# Patient Record
Sex: FEMALE | URBAN - METROPOLITAN AREA
[De-identification: names, ages, dates, MRNs, and addresses within clinical notes are randomized per-mention and may not be internally consistent; named-entity substitution may affect disease eponyms.]

---

## 2022-04-18 ENCOUNTER — TELEPHONE ENCOUNTER (OUTPATIENT)
Dept: URBAN - METROPOLITAN AREA CLINIC 33 | Facility: CLINIC | Age: 47
End: 2022-04-18

## 2022-04-18 ENCOUNTER — OFFICE VISIT (OUTPATIENT)
Dept: URBAN - METROPOLITAN AREA CLINIC 33 | Facility: CLINIC | Age: 47
End: 2022-04-18

## 2022-04-18 NOTE — HPI-DYSPHAGIA
Patient presents today for consultation about dysphagia.  Onset was __ and episodes occur ___ times per __.  Dysphagia occurs with __ solids _ liquids. Patient admits /denies having previous EGD or Barium Swallow completed.

## 2022-04-18 NOTE — HPI-BLOATING
Patient complains of bloating. Patient admits symptoms are more present during /after __. Patient admits symptoms have been present since.  Patient admits /denies taking any OTC medications to relieve symptoms. Patient describes symptoms as ___. Patient admits /denies having any previous breath test including H. Pylori or SIBO ..

## 2022-04-20 ENCOUNTER — TELEPHONE ENCOUNTER (OUTPATIENT)
Dept: URBAN - METROPOLITAN AREA CLINIC 33 | Facility: CLINIC | Age: 47
End: 2022-04-20

## 2022-04-20 ENCOUNTER — OFFICE VISIT (OUTPATIENT)
Dept: URBAN - METROPOLITAN AREA CLINIC 33 | Facility: CLINIC | Age: 47
End: 2022-04-20